# Patient Record
Sex: FEMALE
[De-identification: names, ages, dates, MRNs, and addresses within clinical notes are randomized per-mention and may not be internally consistent; named-entity substitution may affect disease eponyms.]

---

## 2018-11-20 ENCOUNTER — HOSPITAL ENCOUNTER (EMERGENCY)
Dept: HOSPITAL 31 - C.ER | Age: 38
Discharge: HOME | End: 2018-11-20
Payer: MEDICAID

## 2018-11-20 VITALS — OXYGEN SATURATION: 100 %

## 2018-11-20 VITALS
TEMPERATURE: 98.3 F | DIASTOLIC BLOOD PRESSURE: 64 MMHG | HEART RATE: 61 BPM | RESPIRATION RATE: 20 BRPM | SYSTOLIC BLOOD PRESSURE: 102 MMHG

## 2018-11-20 DIAGNOSIS — R10.2: ICD-10-CM

## 2018-11-20 DIAGNOSIS — O26.892: Primary | ICD-10-CM

## 2018-11-20 DIAGNOSIS — Z3A.17: ICD-10-CM

## 2018-11-20 LAB
ALBUMIN SERPL-MCNC: 3.7 G/DL (ref 3.5–5)
ALBUMIN/GLOB SERPL: 0.9 {RATIO} (ref 1–2.1)
ALT SERPL-CCNC: 57 U/L (ref 9–52)
AST SERPL-CCNC: 66 U/L (ref 14–36)
BASOPHILS # BLD AUTO: 0.1 K/UL (ref 0–0.2)
BASOPHILS NFR BLD: 0.6 % (ref 0–2)
BILIRUB UR-MCNC: NEGATIVE MG/DL
BUN SERPL-MCNC: 7 MG/DL (ref 7–17)
CALCIUM SERPL-MCNC: 9.2 MG/DL (ref 8.6–10.4)
EOSINOPHIL # BLD AUTO: 0.1 K/UL (ref 0–0.7)
EOSINOPHIL NFR BLD: 0.8 % (ref 0–4)
ERYTHROCYTE [DISTWIDTH] IN BLOOD BY AUTOMATED COUNT: 13.7 % (ref 11.5–14.5)
GFR NON-AFRICAN AMERICAN: > 60
GLUCOSE UR STRIP-MCNC: NORMAL MG/DL
HGB BLD-MCNC: 12.9 G/DL (ref 11–16)
LEUKOCYTE ESTERASE UR-ACNC: (no result) LEU/UL
LYMPHOCYTES # BLD AUTO: 3.3 K/UL (ref 1–4.3)
LYMPHOCYTES NFR BLD AUTO: 22.7 % (ref 20–40)
MCH RBC QN AUTO: 28.8 PG (ref 27–31)
MCHC RBC AUTO-ENTMCNC: 34 G/DL (ref 33–37)
MCV RBC AUTO: 84.7 FL (ref 81–99)
MONOCYTES # BLD: 1.5 K/UL (ref 0–0.8)
MONOCYTES NFR BLD: 10.2 % (ref 0–10)
NEUTROPHILS # BLD: 9.4 K/UL (ref 1.8–7)
NEUTROPHILS NFR BLD AUTO: 65.7 % (ref 50–75)
NRBC BLD AUTO-RTO: 0 % (ref 0–2)
PH UR STRIP: 5 [PH] (ref 5–8)
PLATELET # BLD: 389 K/UL (ref 130–400)
PMV BLD AUTO: 7.5 FL (ref 7.2–11.7)
PROT UR STRIP-MCNC: NEGATIVE MG/DL
RBC # BLD AUTO: 4.47 MIL/UL (ref 3.8–5.2)
RBC # UR STRIP: NEGATIVE /UL
SP GR UR STRIP: 1.02 (ref 1–1.03)
UROBILINOGEN UR-MCNC: NORMAL MG/DL (ref 0.2–1)
WBC # BLD AUTO: 14.4 K/UL (ref 4.8–10.8)

## 2018-11-20 PROCEDURE — 80053 COMPREHEN METABOLIC PANEL: CPT

## 2018-11-20 PROCEDURE — 84702 CHORIONIC GONADOTROPIN TEST: CPT

## 2018-11-20 PROCEDURE — 81001 URINALYSIS AUTO W/SCOPE: CPT

## 2018-11-20 PROCEDURE — 76815 OB US LIMITED FETUS(S): CPT

## 2018-11-20 PROCEDURE — 99284 EMERGENCY DEPT VISIT MOD MDM: CPT

## 2018-11-20 PROCEDURE — 96360 HYDRATION IV INFUSION INIT: CPT

## 2018-11-20 PROCEDURE — 76770 US EXAM ABDO BACK WALL COMP: CPT

## 2018-11-20 PROCEDURE — 85025 COMPLETE CBC W/AUTO DIFF WBC: CPT

## 2018-11-20 NOTE — C.PDOC
History Of Present Illness


37 y/o female, , presents to the ER complaining of suprapubic and left flank

pain which has been present for the past 3 days. Patient states that the pain is

constant. Patient reports that she took Tylenol with mild relief. She notes that

she is undergoing prenatal care. Patient denies having nausea, vomiting, 

dysuria, hematuria, and vaginal bleeding.


Time Seen by Provider: 18 19:49


Chief Complaint (Nursing): Abdominal Pain


History Per: Patient


History/Exam Limitations: no limitations


Onset/Duration Of Symptoms: Days


Current Symptoms Are (Timing): Still Present


Severity: Moderate





Past Medical History


Reviewed: Historical Data, Nursing Documentation, Vital Signs


Vital Signs: 





                                Last Vital Signs











Temp  97.6 F   18 18:32


 


Pulse  84   18 18:32


 


Resp  16   18 18:32


 


BP  122/70   18 18:32


 


Pulse Ox  100   18 18:32














- Medical History


PMH: No Chronic Diseases


Surgical History: No Surg Hx


Family History: States: No Known Family Hx





- Social History


Hx Alcohol Use: No


Hx Substance Use: No





- Immunization History


Hx Tetanus Toxoid Vaccination: No


Hx Pneumococcal Vaccination: No





Review Of Systems


Except As Marked, All Systems Reviewed And Found Negative.


Constitutional: Negative for: Fever, Chills


Gastrointestinal: Positive for: Abdominal Pain.  Negative for: Nausea, Vomiting


Genitourinary: Negative for: Dysuria, Hematuria, Vaginal Bleeding





Physical Exam





- Physical Exam


Appears: Well, Non-toxic, No Acute Distress


Skin: Normal Color, Warm, Dry


Eye(s): bilateral: Normal Inspection


Oral Mucosa: Moist


Neck: Supple


Chest: Symmetrical


Cardiovascular: Rhythm Regular


Respiratory: Normal Breath Sounds, No Rales, No Rhonchi, No Wheezing


Gastrointestinal/Abdominal: Bowel Sounds, Soft, Tenderness (suprapubic mild 

TTP), No Guarding, No Rebound


Back: Paraspinal Tenderness (left lumbar )


Neurological/Psych: Oriented x3, Normal Speech





ED Course And Treatment





- Laboratory Results


Result Diagrams: 


                                 18 20:19





                                 18 20:19


O2 Sat by Pulse Oximetry: 100 (RA)


Pulse Ox Interpretation: Normal





- CT Scan/US


  ** pelvic US


Other Rad Studies (CT/US): Read By Radiologist, Radiology Report Reviewed


CT/US Interpretation: Name:CHRISSIE AQUINO Exam Date:2018 

9:55:19 PM EST.  MRN:[id number] Modality Type:SD\US\SR.  Accession#[id number]

Description:US - RENAL.  Gender:F Laterality:Bilateral.  :82 Referring 

Physician:MILLIE FRANKLIN MD.  EXAM:  US Retroperitoneum, Renal.  CLINICAL 

HISTORY:  Left flank pain , r/o stone.  TECHNIQUE:  Real-time ultrasound of the 

retroperitoneum with image documentation.  COMPARISON:  None provided.  

FINDINGS:  RIGHT KIDNEY:  Unremarkable. 10.2 cm. Normal in size and contour. No 

renal mass or calculus. No hydronephrosis.  LEFT KIDNEY:  Unremarkable. 10.2 cm.

Normal in size and contour. No renal mass or calculus. No hydronephrosis.  

BLADDER:  Unremarkable as visualized.  MISCELLANEOUS:  No other significant 

abnormality evident.  IMPRESSION:  No acute abnormality evident. No 

hydronephrosis.  .  Electronically signed on 2018 10:40:29 PM EST by:  

Sumit Mendoza M.D., AMANDA Certified By ABR & CBC[employer].  Fellowship Trained MRI and CT 

Specialist.  





  ** renal US


Other Rad Studies (CT/US): Read By Radiologist, Radiology Report Reviewed


CT/US Interpretation: Name:KENIA DICKENS Exam Date:2018 

10:06:25 PM EST.  MRN:[id number] Modality Type:SD\US\SR.  Accessio

n#[id number] Description:US - OB WITH IMAGE DOC LIMITED (HEART BEAT 

PLACENTAL LOC FETAL POS AMNIOTIC FLUID VOL).  Gender:F Laterality:Not 

applicable.  :82 Referring Physician:MILLIE FRANKLIN MD.  Procedure.  OB 

Ultrasound.  History.  Pelvic pain.  Pregnancy.  Comparison.  None available.  

Findings.  Uterus.  Fetal Number:  1.  Fetal Position:  Cephalic.  Fetal Heart 

Rate:  140.70 beats per minute.  Placenta:  Posterior.  2.45 cm clear from Os.  

Cervix: 3.44 cm.  .  The following measurements were obtained:  BPD 3.68 cm, 17 

weeks 2 days.  HC 13.77 cm, 17 weeks 1 day.  AC 11.60 cm, 17 weeks 3 days.  FL 

2.31 cm, 17 weeks 0 days.  .  Estimated ultrasound gestational age 17 weeks, 2 

days.  Other Findings.  None.  Impression.  Limited study.  Single live 

intrauterine gestation - 17 weeks, 2 days.  Fetal heart rate is 140.70 beats per

minute.  Placenta is posterior and is 2.45 cm clear from Os.  .  Electronically 

signed on 2018 11:11:39 PM EST by:  Sumit Mendoza M.D., MBA Certified By 

[employer]R & CBC[employer].  Fellowship Trained MRI and CT Specialist.  


Progress Note: Labs, UA, US- OB Preg. and US-Renal ordered. Patient treated with

IV Fluids and Tylenol PO.





Disposition





- Disposition


Referrals: 


 at [facility] [Outside]


Disposition: HOME/ ROUTINE


Disposition Time: 23:40


Condition: STABLE


Additional Instructions: 


FOLLOW UP WITH YOUR OB/GYN WITHIN 1 WEEK





USE TYLENOL AS NEEDED FOR PAIN





RETURN TO EMERGENCY ROOM IF YOUR SYMPTOMS BECOME WORSE








SEGUIR CON [facility]ELDMAN OB / GYN DENTRO DE 1 SEMANA





UTILICE TYLENOL BRAYAN SE NECESITA PARA EL DOLOR





VUELVA A LA CELINE DE EMERGENCIA SI YOVANY SNTOMAS SE HACEN PEOR


Prescriptions: 


Acetaminophen [Tylenol 325mg tab] 650 mg PO Q6 PRN #30 tab


 PRN Reason: pain/fever


Instructions:  Pregnancy Symptoms


Forms:  [employer] (English)


Print Language: Filipino





- Clinical Impression


Clinical Impression: 


 Pelvic pain affecting pregnancy








- Scribe Statement


The provider has reviewed the documentation as recorded by the Scribe


Xander Calderon


Provider Attestation: 





All medical record entries made by the Scribe were at my direction and 

personally dictated by me. I have reviewed the chart and agree that the record 

accurately reflects my personal performance of the history, physical exam, 

medical decision making, and the department course for this patient. I have also

personally directed, reviewed, and agree with the discharge instructions and 

disposition.

## 2018-11-21 NOTE — US
Indication: Pelvic pain, pregnancy



Comparison: None available



Technique: Real-time ultrasound was performed through the pelvis. 



Findings: 



There is a single living fetus in cephalic presentation.  Posterior 

placenta.  The placenta is not previa.  There are no adnexal masses 

or cysts evident. 



Measurements and calculations: 



Fetus has a composite sonographic age of 17 weeks 2 days.  This 

calculation is based on the biparietal diameter, head circumference, 

abdominal circumference, and femur length. 



Estimated fetal heart rate 140.7 beats per min. 



Estimated fetal weight 183.6 g.



Impression: 



Single living fetus with a composite sonographic age of 17 weeks 2 

days. 



Estimated fetal heart rate 140.7 beats per min. 



The study was performed for the emergent evaluation of pain, and the 

whole anatomic survey of the fetus was not performed. This should be 

performed on an outpatient elective basis as clinically warranted. 



Preliminary impression was provided by [facility].

## 2018-11-21 NOTE — US
Date of service: 11/20/2018



PROCEDURE:  Ultrasound of the Kidneys



HISTORY:

LEFT FLANK PAIN, R/O STONE



COMPARISON:

None available.



TECHNIQUE:

Sonogram of the kidneys.



FINDINGS:



RIGHT KIDNEY:

Measures: 10.2 x 4.4 x 4.5 cm. 



No obstructing calculus, hydronephrosis, or renal cyst identified. 



LEFT KIDNEY:

Measures: 10.2 x 5.1 x 4.9 cm. 



No obstructing calculus, hydronephrosis, or renal cyst identified. 



OTHER FINDINGS:

None.



IMPRESSION:

Unremarkable study as above.



Preliminary impression was provided by [employer].